# Patient Record
Sex: FEMALE | Race: WHITE | NOT HISPANIC OR LATINO | Employment: PART TIME | URBAN - METROPOLITAN AREA
[De-identification: names, ages, dates, MRNs, and addresses within clinical notes are randomized per-mention and may not be internally consistent; named-entity substitution may affect disease eponyms.]

---
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
## 2020-04-01 ENCOUNTER — HOSPITAL ENCOUNTER (EMERGENCY)
Facility: CLINIC | Age: 20
Discharge: HOME | End: 2020-04-01
Attending: EMERGENCY MEDICINE | Admitting: EMERGENCY MEDICINE

## 2020-04-01 VITALS
RESPIRATION RATE: 25 BRPM | SYSTOLIC BLOOD PRESSURE: 148 MMHG | DIASTOLIC BLOOD PRESSURE: 74 MMHG | HEART RATE: 110 BPM | OXYGEN SATURATION: 99 % | TEMPERATURE: 97.8 F

## 2020-04-01 DIAGNOSIS — F43.0 ACUTE REACTION TO STRESS: ICD-10-CM

## 2020-04-01 DIAGNOSIS — R06.02 SHORTNESS OF BREATH: ICD-10-CM

## 2020-04-01 PROCEDURE — 99283 EMERGENCY DEPT VISIT LOW MDM: CPT

## 2020-04-01 PROCEDURE — 25000132 ZZH RX MED GY IP 250 OP 250 PS 637: Performed by: EMERGENCY MEDICINE

## 2020-04-01 RX ORDER — LORAZEPAM 0.5 MG/1
0.5 TABLET ORAL EVERY 6 HOURS PRN
Qty: 15 TABLET | Refills: 0 | Status: SHIPPED | OUTPATIENT
Start: 2020-04-01

## 2020-04-01 RX ORDER — LORAZEPAM 1 MG/1
1 TABLET ORAL ONCE
Status: COMPLETED | OUTPATIENT
Start: 2020-04-01 | End: 2020-04-01

## 2020-04-01 RX ADMIN — LORAZEPAM 1 MG: 1 TABLET ORAL at 23:44

## 2020-04-01 NOTE — ED AVS SNAPSHOT
Emergency Department  [address] [facility] [address]  Phone:  [phone]  Fax:  [phone]                                    Alessia Stone   MRN: [id number]    Department:   Emergency Department   Date of Visit:  4/1/2020           After Visit Summary Signature Page    I have received my discharge instructions, and my questions have been answered. I have discussed any challenges I see with this plan with the nurse or doctor.    ..........................................................................................................................................  Patient/Patient Representative Signature      ..........................................................................................................................................  Patient Representative Print Name and Relationship to Patient    ..................................................               ................................................  Date                                   Time    ..........................................................................................................................................  Reviewed by Signature/Title    ...................................................              ..............................................  Date                                               Time          22EPIC Rev 08/18

## 2020-04-02 NOTE — ED PROVIDER NOTES
History     Chief Complaint:    Shortness of Breath and Cough    The history is provided by the patient.      Alessia Stone is a 19 year old female who presents with shortness of breath for the past 2 days.  While the triage note states that she has had a fever, she denies this, noting that she checks her temperature regularly and has not had an elevated temperature.  She notes that she has had a very mild cough that has been nonproductive in nature.  However, she has become so petrified that she is infected with the pandemic of coronavirus that she states that she has spells where she cannot catch her breath.  She feels as though her chest is getting tight and her heart is pounding.  She feels as though she cannot get enough air.  She finds that this comes and goes throughout the day without any clear exacerbating or alleviating cause.  She admits that she has a history of anxiety and this feels similar to prior panic attacks, but she is worried that it is due to an infectious cause.  She otherwise denies nasal congestion or sore throat.  She denies sick contacts or other high risk issues for coronavirus.  She denies other complaints at this juncture.      Allergies:  Amoxicillin     Medications:    Norco  Nifedipine  Zofran   Past Medical History:    ADHD    Past Surgical History:    The patient does not have any pertinent past surgical history.    Family History:    No past pertinent family history.    Social History:  Negative for tobacco use - former smoker.  Positive for alcohol use - occasionally.  Negative for drug use.  Marital Status:  Single [1]     Review of Systems  Pertinent positives and negatives as above.  A 14-point review of systems was performed with all other systems reviewed as negative.      Physical Exam     Patient Vitals for the past 24 hrs:   BP Temp Temp src Pulse Heart Rate Resp SpO2   04/01/20 2302 -- -- -- -- -- -- 99 %   04/01/20 2300 (!) 148/74 97.8  F (36.6  C) Oral 110 110 25  98 %     Physical Exam  General:  Markedly anxious young woman, almost unable to provide history due to her panic.     Eye:  Pupils are equal, round, and reactive.  Extraocular movements intact.    ENT:  Patient is masked during my assessment, though no audible signs of congestion or laryngitis.    Pulmonary: Patient is hyperventilating though oxygenating at 99 to 100% throughout her evaluation.  There is no audible wheezing or stridor noted.    Cardiac:  While not auscultated, heart rate appears top be regular on the monitor, ranging from 100-130 based on her level of anxiety.    Musculoskeletal:  Normal movement of all extremities without evidence for deficit.    Skin:  Warm and dry without rashes.    Neurologic:  Non-focal exam without asymmetric weakness or numbness.     Psychiatric:  Patient is tearful though able to calm herself. No suicidal ideation.    Emergency Department Course     Interventions:  2344: Ativan 1 mg PO    Emergency Department Course:  Past medical records, nursing notes, and vitals reviewed.    2316: I performed an exam of the patient as documented above.     I personally reviewed the results with the Patient and answered all related questions prior to discharge.     Findings and plan explained to the Patient. Patient discharged home with instructions regarding supportive care, medications, and reasons to return. The importance of close follow-up was reviewed.     Impression & Plan     Medical Decision Making:  This unfortunate young woman presents to us with a sensation of shortness of breath that seems to be coming and going.  She describes a very minimal cough 2 days ago, but no ongoing cough here.  She has no signs of congestion.  She has no fever.  I explained to the patient this seems highly unlikely to represent an infectious etiology and even less likely to represent the serious sequelae of the coronavirus.  The patient appears overcome with panic.  I spoke with her mother on the  phone as well who notes that her daughter has struggled with this for some time.  With her normal oxygenation and history and physical being more consistent with this being an acute reaction to stress, I do not feel that she would require an x-ray, labs, or further work-up.  I did consider pulmonary embolism considering her tachycardia but she otherwise is PERC negative outside of her tachycardia and I feel that the likelihood of this representing a serious pathology with waxing and waning symptoms without other risk factors in the setting of being overtly anxious that I do not feel a d-dimer is necessary at this time.  Furthermore, the likelihood of acute coronary syndrome or other significant tachydysrhythmia would be unlikely.  I reassured the patient.  She was given a dose of lorazepam here and will be discharged with a prescription for the same which she can use when she is feeling overwhelmed.  I advised her to focus on meditation, exercise, and to be reassured that she does not appear to have an impending respiratory illness.  However, she was invited back at any point for worsening of condition or other emergent concerns.        Diagnosis:    ICD-10-CM    1. Shortness of breath  R06.02    2. Acute reaction to stress  F43.0     PROBABLE     Disposition:  Discharged to home.    Discharge Medications:  New Prescriptions    LORAZEPAM (ATIVAN) 0.5 MG TABLET    Take 1 tablet (0.5 mg) by mouth every 6 hours as needed for anxiety     Scribe Disclosure:  I, Debbie Moss, am serving as a scribe at 11:07 PM on 4/1/2020 to document services personally performed by Trierweiler, Chad A, MD based on my observations and the provider's statements to me.     Debbie Moss  4/1/2020    EMERGENCY DEPARTMENT       Trierweiler, Chad A, MD  04/02/20 0113       Trierweiler, Chad A, MD  04/02/20 0146

## 2020-07-10 ENCOUNTER — VIRTUAL VISIT (OUTPATIENT)
Dept: INTERNAL MEDICINE | Facility: CLINIC | Age: 20
End: 2020-07-10
Payer: MEDICAID

## 2020-07-10 DIAGNOSIS — F41.1 GAD (GENERALIZED ANXIETY DISORDER): Primary | ICD-10-CM

## 2020-07-10 DIAGNOSIS — F41.0 PANIC ATTACK: ICD-10-CM

## 2020-07-10 PROCEDURE — 99203 OFFICE O/P NEW LOW 30 MIN: CPT | Mod: 95 | Performed by: INTERNAL MEDICINE

## 2020-07-10 PROCEDURE — 96127 BRIEF EMOTIONAL/BEHAV ASSMT: CPT | Performed by: INTERNAL MEDICINE

## 2020-07-10 RX ORDER — HYDROXYZINE HYDROCHLORIDE 25 MG/1
25 TABLET, FILM COATED ORAL 3 TIMES DAILY PRN
Qty: 20 TABLET | Refills: 0 | Status: SHIPPED | OUTPATIENT
Start: 2020-07-10

## 2020-07-10 RX ORDER — CITALOPRAM HYDROBROMIDE 20 MG/1
20 TABLET ORAL DAILY
Qty: 30 TABLET | Refills: 0 | Status: SHIPPED | OUTPATIENT
Start: 2020-07-10

## 2020-07-10 ASSESSMENT — PATIENT HEALTH QUESTIONNAIRE - PHQ9: 5. POOR APPETITE OR OVEREATING: MORE THAN HALF THE DAYS

## 2020-07-10 ASSESSMENT — ANXIETY QUESTIONNAIRES
1. FEELING NERVOUS, ANXIOUS, OR ON EDGE: NEARLY EVERY DAY
IF YOU CHECKED OFF ANY PROBLEMS ON THIS QUESTIONNAIRE, HOW DIFFICULT HAVE THESE PROBLEMS MADE IT FOR YOU TO DO YOUR WORK, TAKE CARE OF THINGS AT HOME, OR GET ALONG WITH OTHER PEOPLE: VERY DIFFICULT
3. WORRYING TOO MUCH ABOUT DIFFERENT THINGS: NEARLY EVERY DAY
GAD7 TOTAL SCORE: 18
2. NOT BEING ABLE TO STOP OR CONTROL WORRYING: NEARLY EVERY DAY
5. BEING SO RESTLESS THAT IT IS HARD TO SIT STILL: NEARLY EVERY DAY
7. FEELING AFRAID AS IF SOMETHING AWFUL MIGHT HAPPEN: SEVERAL DAYS
6. BECOMING EASILY ANNOYED OR IRRITABLE: NEARLY EVERY DAY

## 2020-07-10 ASSESSMENT — ENCOUNTER SYMPTOMS
NERVOUS/ANXIOUS: 1
SLEEP DISTURBANCE: 1
HYPERACTIVE: 1

## 2020-07-10 NOTE — PROGRESS NOTES
".Alessia Stone is a 20 year old female who is being evaluated via a billable video visit.      The patient has been notified of following:     \"This video visit will be conducted via a call between you and your physician/provider. We have found that certain health care needs can be provided without the need for an in-person physical exam.  This service lets us provide the care you need with a video conversation.  If a prescription is necessary we can send it directly to your pharmacy.  If lab work is needed we can place an order for that and you can then stop by our lab to have the test done at a later time.    Video visits are billed at different rates depending on your insurance coverage.  Please reach out to your insurance provider with any questions.    If during the course of the call the physician/provider feels a video visit is not appropriate, you will not be charged for this service.\"    Patient has given verbal consent for Video visit? Yes  How would you like to obtain your AVS? Mail a copy  Patient would like the video invitation sent by: Text to cell phone: [phone]  Will anyone else be joining your video visit? No    Subjective     Alessia Stone is a 20 year old female who presents today via video visit for the following health issues:    [facility]    Video Start Time: 11:38 AM    Patient has anxiety symptoms for long time.  Anxiety is acting up. Super overwhelmed, hear racing, feels twitchy, worry about everything. Sleep is horrible, stay up till 4am, sleep 1-2 hrs/day. Denies suicidal ideation.  Live with mom, siblings, boy friend.    Try to cope by colouring, pressure rings.    Have been doing counseling for more than a year, tried lorazepam recently but didn't feel like it helped.    PREMA-7 SCORE 7/10/2020   Total Score 18       There is no problem list on file for this patient.    History reviewed. No pertinent surgical history.    Social History     Tobacco Use     Smoking status: Former Smoker " "    Smokeless tobacco: Never Used   Substance Use Topics     Alcohol use: Yes     Comment: occ     History reviewed. No pertinent family history.      Current Outpatient Medications   Medication Sig Dispense Refill     citalopram (CELEXA) 20 MG tablet Take 1 tablet (20 mg) by mouth daily 30 tablet 0     hydrOXYzine (ATARAX) 25 MG tablet Take 1 tablet (25 mg) by mouth 3 times daily as needed for anxiety 20 tablet 0     ibuprofen (ADVIL/MOTRIN) 200 MG tablet Take 3 tablets (600 mg) by mouth every 6 hours as needed for pain 20 tablet 0     LORazepam (ATIVAN) 0.5 MG tablet Take 1 tablet (0.5 mg) by mouth every 6 hours as needed for anxiety 15 tablet 0     Allergies   Allergen Reactions     Amoxicillin Nausea       Reviewed and updated as needed this visit by Provider    Review of Systems   Psychiatric/Behavioral: Positive for sleep disturbance. Negative for suicidal ideas. The patient is nervous/anxious and is hyperactive.           Objective         Physical Exam     \"GENERAL: Healthy, alert and no distress\",\"EYES: Eyes grossly normal to inspection.  No discharge or erythema, or obvious scleral/conjunctival abnormalities.\",\"RESP: No audible wheeze, cough, or visible cyanosis.  No visible retractions or increased work of breathing.  \",\"SKIN: Visible skin clear. No significant rash, abnormal pigmentation or lesions.\",\"NEURO: Cranial nerves grossly intact.  Mentation and speech appropriate for age.\",\"PSYCH: Mentation appears normal, affect normal/bright, judgement and insight intact, normal speech and appearance well-groomed.\"    Assessment & Plan     Alessia was seen today for anxiety.    Diagnoses and all orders for this visit:    PREMA (generalized anxiety disorder)  -     citalopram (CELEXA) 20 MG tablet; Take 1 tablet (20 mg) by mouth daily  -     EMOTIONAL / BEHAVIORAL ASSESSMENT    Panic attack  -     hydrOXYzine (ATARAX) 25 MG tablet; Take 1 tablet (25 mg) by mouth 3 times daily as needed for anxiety  -     " EMOTIONAL / BEHAVIORAL ASSESSMENT    Coping mechanisms discussed.  Not going to counseling nate to insurance reasons.  Trial of serotonin specific reuptake inhibitor.   Advised to call if symptoms are not improving or develops side effects.     Return in about 4 weeks (around 8/7/2020) for Video Visit.    Marybeth Hemphill MD  [facility]      Video-Visit Details    Type of service:  Video Visit    Video End Time:11:46 AM    Originating Location (pt. Location): Home    Distant Location (provider location):  HOME    Platform used for Video Visit: Doximity    Return in about 4 weeks (around 8/7/2020) for Video Visit.       Marybeth Hemphill MD

## 2020-07-10 NOTE — PATIENT INSTRUCTIONS
Patient Education     Understanding Anxiety Disorders  Almost everyone gets nervous now and then. It s normal to have knots in your stomach before a test, or for your heart to race on a first date. But an anxiety disorder is much more than a case of nerves. In fact, its symptoms may be overwhelming. But treatment can relieve many of these symptoms. Talking to your healthcare provider is the first step.    What are anxiety disorders?  An anxiety disorder causes intense feelings of panic and fear. These feelings may arise for no apparent reason. And they tend to recur again and again. They may prevent you from coping with life and cause you great distress. As a result, you may avoid anything that triggers your fear. In extreme cases, you may never leave the house. Anxiety disorders may cause other symptoms, such as:    Obsessive thoughts that are unwanted and you can t control    Constant nightmares or painful thoughts of the past    Nausea, sweating, and muscle tension    Trouble sleeping or concentrating  What causes anxiety disorders?  Anxiety disorders tend to run in families. For some people, childhood abuse or neglect may play a role. For others, stressful life events or trauma may trigger anxiety disorders. Anxiety can trigger low self-esteem and poor coping skills.    Panic disorder. This causes an intense fear of being in danger.    Phobias. These are extreme fears of certain objects, places, or events.    Obsessive-compulsive disorder. This causes you to have unwanted thoughts and urges. You also may perform certain actions over and over.    Posttraumatic stress disorder. This occurs in people who have survived a terrible ordeal. It can cause nightmares and flashbacks about the event.    Generalized anxiety disorder. This causes constant worry that can greatly disrupt your life.    Getting better  You may believe that nothing can help you. Or, you might fear what others may think. But most anxiety symptoms  can be eased. Having an anxiety disorder is nothing to be ashamed of. Most people do best with treatment that combines medicine and individual and group therapy. These aren t cures. But they can help you live a healthier life.  Date Last Reviewed: 2/1/2017 2000-2019 [employer]. [address] [place] [address], [place] [address]. All rights reserved. This information is not intended as a substitute for professional medical care. Always follow your healthcare professional's instructions.

## 2020-07-10 NOTE — NURSING NOTE
"Chief Complaint   Patient presents with     Anxiety     pt c/o anxiety sumptoms and pt has used Lorazepam.     initial LMP 06/05/2020 (Approximate)   Breastfeeding No  Estimated body mass index is 20.97 kg/m  as calculated from the following:    Height as of 9/23/16: 1.6 m (5' 3\").    Weight as of 4/25/18: 53.7 kg (118 lb 6.2 oz)..  bp completed using cuff size NA (Not Taken)  JUWAN CERDA LPN  "

## 2020-07-11 ASSESSMENT — ANXIETY QUESTIONNAIRES: GAD7 TOTAL SCORE: 18

## 2020-07-28 ENCOUNTER — APPOINTMENT (OUTPATIENT)
Dept: ULTRASOUND IMAGING | Facility: CLINIC | Age: 20
End: 2020-07-28
Attending: EMERGENCY MEDICINE
Payer: MEDICAID

## 2020-07-28 ENCOUNTER — HOSPITAL ENCOUNTER (EMERGENCY)
Facility: CLINIC | Age: 20
Discharge: HOME | End: 2020-07-28
Attending: EMERGENCY MEDICINE | Admitting: EMERGENCY MEDICINE
Payer: MEDICAID

## 2020-07-28 VITALS
HEART RATE: 92 BPM | TEMPERATURE: 98.2 F | DIASTOLIC BLOOD PRESSURE: 66 MMHG | OXYGEN SATURATION: 98 % | RESPIRATION RATE: 16 BRPM | SYSTOLIC BLOOD PRESSURE: 119 MMHG

## 2020-07-28 DIAGNOSIS — N83.201 CYST OF RIGHT OVARY: ICD-10-CM

## 2020-07-28 DIAGNOSIS — R10.2 PELVIC PAIN IN FEMALE: ICD-10-CM

## 2020-07-28 DIAGNOSIS — Z3A.01 LESS THAN 8 WEEKS GESTATION OF PREGNANCY: ICD-10-CM

## 2020-07-28 LAB
ABO + RH BLD: NORMAL
ABO + RH BLD: NORMAL
ALBUMIN UR-MCNC: 20 MG/DL
ANION GAP SERPL CALCULATED.3IONS-SCNC: 8 MMOL/L (ref 3–14)
APPEARANCE UR: CLEAR
B-HCG FREE SERPL-ACNC: >2000 IU/L
B-HCG SERPL-ACNC: ABNORMAL IU/L (ref 0–5)
BASOPHILS # BLD AUTO: 0 10E9/L (ref 0–0.2)
BASOPHILS NFR BLD AUTO: 0.3 %
BILIRUB UR QL STRIP: NEGATIVE
BLD GP AB SCN SERPL QL: NORMAL
BLOOD BANK CMNT PATIENT-IMP: NORMAL
BUN SERPL-MCNC: 11 MG/DL (ref 7–30)
CALCIUM SERPL-MCNC: 9.6 MG/DL (ref 8.5–10.1)
CHLORIDE SERPL-SCNC: 105 MMOL/L (ref 94–109)
CO2 SERPL-SCNC: 23 MMOL/L (ref 20–32)
COLOR UR AUTO: YELLOW
CREAT SERPL-MCNC: 0.43 MG/DL (ref 0.52–1.04)
DIFFERENTIAL METHOD BLD: ABNORMAL
EOSINOPHIL # BLD AUTO: 0.2 10E9/L (ref 0–0.7)
EOSINOPHIL NFR BLD AUTO: 2.7 %
ERYTHROCYTE [DISTWIDTH] IN BLOOD BY AUTOMATED COUNT: 12.4 % (ref 10–15)
GFR SERPL CREATININE-BSD FRML MDRD: >90 ML/MIN/{1.73_M2}
GLUCOSE SERPL-MCNC: 83 MG/DL (ref 70–99)
GLUCOSE UR STRIP-MCNC: NEGATIVE MG/DL
HCT VFR BLD AUTO: 40.8 % (ref 35–47)
HGB BLD-MCNC: 13.4 G/DL (ref 11.7–15.7)
HGB UR QL STRIP: ABNORMAL
IMM GRANULOCYTES # BLD: 0 10E9/L (ref 0–0.4)
IMM GRANULOCYTES NFR BLD: 0.3 %
KETONES UR STRIP-MCNC: NEGATIVE MG/DL
LEUKOCYTE ESTERASE UR QL STRIP: NEGATIVE
LYMPHOCYTES # BLD AUTO: 2.1 10E9/L (ref 0.8–5.3)
LYMPHOCYTES NFR BLD AUTO: 31.7 %
MCH RBC QN AUTO: 26.3 PG (ref 26.5–33)
MCHC RBC AUTO-ENTMCNC: 32.8 G/DL (ref 31.5–36.5)
MCV RBC AUTO: 80 FL (ref 78–100)
MONOCYTES # BLD AUTO: 0.8 10E9/L (ref 0–1.3)
MONOCYTES NFR BLD AUTO: 12 %
MUCOUS THREADS #/AREA URNS LPF: PRESENT /LPF
NEUTROPHILS # BLD AUTO: 3.6 10E9/L (ref 1.6–8.3)
NEUTROPHILS NFR BLD AUTO: 53 %
NITRATE UR QL: NEGATIVE
NRBC # BLD AUTO: 0 10*3/UL
NRBC BLD AUTO-RTO: 0 /100
PH UR STRIP: 6 PH (ref 5–7)
PLATELET # BLD AUTO: 315 10E9/L (ref 150–450)
POTASSIUM SERPL-SCNC: 3.8 MMOL/L (ref 3.4–5.3)
RBC # BLD AUTO: 5.09 10E12/L (ref 3.8–5.2)
RBC #/AREA URNS AUTO: 5 /HPF (ref 0–2)
SODIUM SERPL-SCNC: 136 MMOL/L (ref 133–144)
SOURCE: ABNORMAL
SP GR UR STRIP: 1.03 (ref 1–1.03)
SPECIMEN EXP DATE BLD: NORMAL
SQUAMOUS #/AREA URNS AUTO: 6 /HPF (ref 0–1)
UROBILINOGEN UR STRIP-MCNC: NORMAL MG/DL (ref 0–2)
WBC # BLD AUTO: 6.7 10E9/L (ref 4–11)
WBC #/AREA URNS AUTO: 2 /HPF (ref 0–5)

## 2020-07-28 PROCEDURE — 25000132 ZZH RX MED GY IP 250 OP 250 PS 637: Performed by: EMERGENCY MEDICINE

## 2020-07-28 PROCEDURE — 96374 THER/PROPH/DIAG INJ IV PUSH: CPT

## 2020-07-28 PROCEDURE — 25000128 H RX IP 250 OP 636: Performed by: EMERGENCY MEDICINE

## 2020-07-28 PROCEDURE — 86901 BLOOD TYPING SEROLOGIC RH(D): CPT | Performed by: EMERGENCY MEDICINE

## 2020-07-28 PROCEDURE — 99284 EMERGENCY DEPT VISIT MOD MDM: CPT | Mod: 25

## 2020-07-28 PROCEDURE — 85025 COMPLETE CBC W/AUTO DIFF WBC: CPT | Performed by: EMERGENCY MEDICINE

## 2020-07-28 PROCEDURE — 81001 URINALYSIS AUTO W/SCOPE: CPT | Performed by: EMERGENCY MEDICINE

## 2020-07-28 PROCEDURE — 86900 BLOOD TYPING SEROLOGIC ABO: CPT | Performed by: EMERGENCY MEDICINE

## 2020-07-28 PROCEDURE — 84702 CHORIONIC GONADOTROPIN TEST: CPT | Performed by: EMERGENCY MEDICINE

## 2020-07-28 PROCEDURE — 25800030 ZZH RX IP 258 OP 636: Performed by: EMERGENCY MEDICINE

## 2020-07-28 PROCEDURE — 86850 RBC ANTIBODY SCREEN: CPT | Performed by: EMERGENCY MEDICINE

## 2020-07-28 PROCEDURE — 96361 HYDRATE IV INFUSION ADD-ON: CPT

## 2020-07-28 PROCEDURE — 80048 BASIC METABOLIC PNL TOTAL CA: CPT | Performed by: EMERGENCY MEDICINE

## 2020-07-28 PROCEDURE — 76817 TRANSVAGINAL US OBSTETRIC: CPT

## 2020-07-28 PROCEDURE — 84702 CHORIONIC GONADOTROPIN TEST: CPT | Mod: 59

## 2020-07-28 RX ORDER — ONDANSETRON 2 MG/ML
4 INJECTION INTRAMUSCULAR; INTRAVENOUS ONCE
Status: COMPLETED | OUTPATIENT
Start: 2020-07-28 | End: 2020-07-28

## 2020-07-28 RX ORDER — ACETAMINOPHEN 500 MG
1000 TABLET ORAL ONCE
Status: COMPLETED | OUTPATIENT
Start: 2020-07-28 | End: 2020-07-28

## 2020-07-28 RX ORDER — ACETAMINOPHEN 500 MG
1000 TABLET ORAL EVERY 6 HOURS PRN
Qty: 30 TABLET | Refills: 0 | Status: SHIPPED | OUTPATIENT
Start: 2020-07-28 | End: 2020-08-04

## 2020-07-28 RX ADMIN — ACETAMINOPHEN 1000 MG: 500 TABLET, FILM COATED ORAL at 11:35

## 2020-07-28 RX ADMIN — SODIUM CHLORIDE 1000 ML: 9 INJECTION, SOLUTION INTRAVENOUS at 11:35

## 2020-07-28 RX ADMIN — ONDANSETRON 4 MG: 2 INJECTION INTRAMUSCULAR; INTRAVENOUS at 11:35

## 2020-07-28 ASSESSMENT — ENCOUNTER SYMPTOMS
CHILLS: 0
NAUSEA: 1
VOMITING: 0
ABDOMINAL PAIN: 1
FEVER: 0

## 2020-07-28 NOTE — ED PROVIDER NOTES
"  History     Chief Complaint:  Abdominal Pain       HPI   Alessia Stone is a 20 year old female who is 8 weeks pregnant  with a history of ovarian cysts presents with abdominal pain. The patient reports developing a sudden onset of \"left ovary\" pain this morning. She states that the pain does not radiate anywhere and rates the pain as a 7/10 pain. She has associated nausea but no vomiting. She has not taken anything for her pain. She denies any fevers, chills, or vaginal bleeding/discharge.  She has not yet established care with OBGYN but [facility]     Allergies:  Amoxicillin     Medications:    citalopram   Atarax   Ativan     Past Medical History:    ADHD (attention deficit hyperactivity disorder)   PREMA (generalized anxiety disorder)   Panic attack   Ovarian cysts    Past Surgical History:    Surgical history reviewed. No pertinent surgical history.    Family History:    Family history reviewed. No pertinent family history.      Social History:  Smoking Status: former  Smokeless Tobacco: Never Used  Alcohol Use: Yes  Drug Use: No  PCP: [facility], [place]     Review of Systems   Constitutional: Negative for chills and fever.   Gastrointestinal: Positive for abdominal pain (\"L. ovary\") and nausea. Negative for vomiting.   Genitourinary: Negative for vaginal bleeding.   All other systems reviewed and are negative.      Physical Exam     Patient Vitals for the past 24 hrs:   BP Temp Temp src Pulse Resp SpO2   20 1315 -- -- -- -- -- 98 %   20 1310 -- -- -- -- -- 98 %   20 1305 -- -- -- -- -- 99 %   20 1300 119/66 -- -- 92 16 97 %   20 1250 -- -- -- -- -- 98 %   20 1245 130/55 -- -- 106 -- 99 %   20 1240 136/67 -- -- -- -- --   20 1205 -- -- -- -- -- 99 %   20 1200 120/60 -- -- 101 16 99 %   20 1155 -- -- -- -- -- 99 %   20 1150 120/61 -- -- -- -- 100 %   20 1140 121/62 -- -- 106 -- -- "   07/28/20 [facility] 125/83 98.2  F (36.8  C) Oral 118 18 99 %        Physical Exam  Nursing note and vitals reviewed.  Constitutional: Well nourished.   Eyes: Conjunctiva normal.  Pupils are equal, round, and reactive to light.   ENT: Nose normal. Mucous membranes pink and moist.    Neck: Normal range of motion.  CVS: Sinus tachycardia.  Normal heart sounds.  No murmur.  Pulmonary: Lungs clear to auscultation bilaterally. No wheezes/rales/rhonchi.  GI: Abdomen soft. Mild L. Adnexal tenderness.  No rigidity or guarding.  No CVA tenderness  Pelvic: deferred by patient  MSK: No calf tenderness or swelling.  Neuro: Alert. Follows simple commands.  Skin: Skin is warm and dry. No rash noted.   Psychiatric: Normal affect.       Emergency Department Course     Imaging:  Radiology findings were communicated with the patient who voiced understanding of the findings.    US OB <14 Weeks W Transvaginal  Preliminary Result  IMPRESSION:   1. Single intrauterine gestation with cardiac activity at 6 weeks and  3 days.  2. 2.4 cm complex right ovarian cyst.  Reading per radiology     Laboratory:  Laboratory findings were communicated with the patient who voiced understanding of the findings.    UA with micro: Urine Blood trace (A) Protein Albumin Urine 20 (A) RBC/HPF 5 (H) Squamous epithelial/HPF urine 6 (H) Mucous urine present (A)  o/w wnl/negative       ABO/Rh type and screen: O positive     CBC:  WBC 6.7, HGB 13.4, , o/w WNL      BMP: AWNL (Creatinine 0.43 (L))     HCG Quantitative blood: 14,730 (H)     ISTAT HCG quantitative pregnancy POCT: >2,000 (H)     Interventions:  1135 Tylenol 1000 mg oral   1135 zofran 4 mg IV   1135 0.9% sodium chloride BOLUS 1000 mL IV     Emergency Department Course:  Past medical records, nursing notes, and vitals reviewed.    1107 I performed an exam of the patient as documented above.    IV was inserted and blood was drawn for laboratory testing, results above.     The patient provided a urine  sample here in the emergency department. This was sent for laboratory testing, findings above.     The patient was sent for imaging while in the emergency department, results above.       1305 Patient rechecked and updated.       Findings and plan explained to the Patient. Patient discharged home with instructions regarding supportive care, medications, and reasons to return. The importance of close follow-up was reviewed. The patient was prescribed Tylenol.       Impression & Plan     Medical Decision Making:  Alessia Stone is a 20 year old female who presents to the emergency department today with L. Pelvic pain.  She is nontoxic though mildly tachycardiac on arrival.  She is deferring pelvic exam though denies vaginal bleeding/discharge/concerns for STI.  Patient with confirmed live IUP today.  Incidental R. Ovarian cyst discussed with the patient.  No indication for RHOGAM.  UA without obvious infection; labs reassuring.  She reported symptom improvement after tylenol.  I doubt intraabdominal catastrophe including appendicitis, diverticulitis, pyelonephritis, SBO.  Her repeat abdominal exam is benign.  Monitor for fever, worsening pain, vaginal bleeding.  Recommended f/u OBGYN to establish care.  Counseled on importance of prenatals.  All questions addressed.      Discharge Diagnosis:    ICD-10-CM    1. Pelvic pain in female  R10.2    2. Less than 8 weeks gestation of pregnancy  Z3A.01    3. Cyst of right ovary  N83.201        Disposition:  Discharged to home.    Discharge Medications:  New Prescriptions    ACETAMINOPHEN (TYLENOL) 500 MG TABLET    Take 2 tablets (1,000 mg) by mouth every 6 hours as needed for mild pain       Scribe Disclosure:  Teresa MCCLELLANe Kimberly, am serving as a scribe at 11:07 AM on 7/28/2020 to document services personally performed by Lise Dalton DO based on my observations and the provider's statements to me.      7/28/2020   Lise Dalton DO McDonald, Lindsey E,  DO  07/28/20 132

## 2020-07-28 NOTE — ED TRIAGE NOTES
Patient presents with complaints of left lower quadrant pain which started this morning. Patient is currently 8 weeks pregnant. Denies any bleeding. ABC intact without need for intervention at this time.

## 2021-10-09 ENCOUNTER — NURSE TRIAGE (OUTPATIENT)
Dept: NURSING | Facility: CLINIC | Age: 21
End: 2021-10-09

## 2021-10-09 NOTE — TELEPHONE ENCOUNTER
Patient states she is 7 months postpartum and has thyroid issues. Today she noticed her thyroid gland is bulging from her neck. Her sister says the gland has gotten noticeably bigger since this morning. Patient reports difficulty breathing and her voice is hoarse. Reports she feels very sleepy. She is able to swallow, denies fever. Advised that she should be seen in the emergency room for an evaluation. Patient verbalized understanding and agreed to the disposition.    Martha Grayson RN/[facility]      COVID 19 Nurse Triage Plan/Patient Instructions    Please be aware that novel coronavirus (COVID-19) may be circulating in the community. If you develop symptoms such as fever, cough, or SOB or if you have concerns about the presence of another infection including coronavirus (COVID-19), please contact your health care provider or visit [email or web address].     Disposition/Instructions    ED Visit recommended. Follow protocol based instructions.     Bring Your Own Device:  Please also bring your smart device(s) (smart phones, tablets, laptops) and their charging cables for your personal use and to communicate with your care team during your visit.    Thank you for taking steps to prevent the spread of this virus.  o Limit your contact with others.  o Wear a simple mask to cover your cough.  o Wash your hands well and often.    Resources    [facility]: About COVID-19: [email or web address]    CDC: What to Do If You're Sick: www.cdc.gov/coronavirus/2019-ncov/about/steps-when-sick.html    CDC: Ending Home Isolation: www.cdc.gov/coronavirus/2019-ncov/hcp/disposition-in-home-patients.html     CDC: Caring for Someone: www.cdc.gov/coronavirus/2019-ncov/if-you-are-sick/care-for-someone.html     [facility]: Interim Guidance for Hospital Discharge to Home: www.health.[facility].[place].us/diseases/coronavirus/hcp/hospdischarge.pdf    [facility] clinical trials (COVID-19 research  studies): clinicalaffairs.[facility]-clinical-trials     Below are the COVID-19 hotlines at the [place] Department of [facility] ([facility]). Interpreters are available.   o For health questions: Call 026-143-5410 or 1-518.392.4262 (7 a.m. to 7 p.m.)  o For questions about schools and childcare: Call 695-911-9946 or 1-454.183.4615 (7 a.m. to 7 p.m.)     Reason for Disposition    [1] Node is in the neck AND [2] causes difficulty breathing    Additional Information    Negative: Severe difficulty breathing (e.g., struggling for each breath, speaks in single words)    Protocols used: LYMPH NODES - [id number]

## 2021-10-11 ENCOUNTER — NURSE TRIAGE (OUTPATIENT)
Dept: NURSING | Facility: CLINIC | Age: 21
End: 2021-10-11

## 2021-10-11 NOTE — TELEPHONE ENCOUNTER
Patient clling and stating her thyroids were swollen, and she was seen at [facility] on Saturday.    She had tests done there, and she is calling for the results.    She was advised to call [facility]. [place] for results.    Ruthann Arnold, RN RN  [facility] Triage/refill nurse    Reason for Disposition    [1] Caller requests to speak ONLY to PCP AND [2] urgent question    Protocols used: PCP CALL - NO TRIAGE-A-

## 2021-10-23 ENCOUNTER — APPOINTMENT (OUTPATIENT)
Dept: ULTRASOUND IMAGING | Facility: CLINIC | Age: 21
End: 2021-10-23
Attending: EMERGENCY MEDICINE
Payer: COMMERCIAL

## 2021-10-23 ENCOUNTER — HOSPITAL ENCOUNTER (EMERGENCY)
Facility: CLINIC | Age: 21
Discharge: HOME | End: 2021-10-24
Attending: EMERGENCY MEDICINE | Admitting: EMERGENCY MEDICINE
Payer: COMMERCIAL

## 2021-10-23 VITALS
HEART RATE: 87 BPM | OXYGEN SATURATION: 100 % | RESPIRATION RATE: 20 BRPM | WEIGHT: 121 LBS | BODY MASS INDEX: 21.44 KG/M2 | HEIGHT: 63 IN | SYSTOLIC BLOOD PRESSURE: 135 MMHG | DIASTOLIC BLOOD PRESSURE: 76 MMHG | TEMPERATURE: 97.7 F

## 2021-10-23 DIAGNOSIS — E05.90 HYPERTHYROIDISM: ICD-10-CM

## 2021-10-23 DIAGNOSIS — R55 NEAR SYNCOPE: ICD-10-CM

## 2021-10-23 DIAGNOSIS — R10.9 FLANK PAIN: ICD-10-CM

## 2021-10-23 LAB
ALBUMIN SERPL-MCNC: 3.3 G/DL (ref 3.4–5)
ALBUMIN UR-MCNC: NEGATIVE MG/DL
ALP SERPL-CCNC: 211 U/L (ref 40–150)
ALT SERPL W P-5'-P-CCNC: 34 U/L (ref 0–50)
ANION GAP SERPL CALCULATED.3IONS-SCNC: 6 MMOL/L (ref 3–14)
APPEARANCE UR: CLEAR
AST SERPL W P-5'-P-CCNC: 21 U/L (ref 0–45)
B-HCG FREE SERPL-ACNC: <5 IU/L (ref 0–5)
BASOPHILS # BLD AUTO: 0 10E3/UL (ref 0–0.2)
BASOPHILS NFR BLD AUTO: 0 %
BILIRUB SERPL-MCNC: 1.2 MG/DL (ref 0.2–1.3)
BILIRUB UR QL STRIP: NEGATIVE
BUN SERPL-MCNC: 9 MG/DL (ref 7–30)
CALCIUM SERPL-MCNC: 8.5 MG/DL (ref 8.5–10.1)
CHLORIDE BLD-SCNC: 106 MMOL/L (ref 94–109)
CO2 SERPL-SCNC: 26 MMOL/L (ref 20–32)
COLOR UR AUTO: NORMAL
CREAT SERPL-MCNC: 0.71 MG/DL (ref 0.52–1.04)
EOSINOPHIL # BLD AUTO: 0.1 10E3/UL (ref 0–0.7)
EOSINOPHIL NFR BLD AUTO: 1 %
ERYTHROCYTE [DISTWIDTH] IN BLOOD BY AUTOMATED COUNT: 13 % (ref 10–15)
GFR SERPL CREATININE-BSD FRML MDRD: >90 ML/MIN/1.73M2
GLUCOSE BLD-MCNC: 90 MG/DL (ref 70–99)
GLUCOSE UR STRIP-MCNC: NEGATIVE MG/DL
HCT VFR BLD AUTO: 38.1 % (ref 35–47)
HGB BLD-MCNC: 12 G/DL (ref 11.7–15.7)
HGB UR QL STRIP: NEGATIVE
HOLD SPECIMEN: NORMAL
HOLD SPECIMEN: NORMAL
IMM GRANULOCYTES # BLD: 0 10E3/UL
IMM GRANULOCYTES NFR BLD: 0 %
KETONES UR STRIP-MCNC: NEGATIVE MG/DL
LEUKOCYTE ESTERASE UR QL STRIP: NEGATIVE
LIPASE SERPL-CCNC: 66 U/L (ref 73–393)
LYMPHOCYTES # BLD AUTO: 3.2 10E3/UL (ref 0.8–5.3)
LYMPHOCYTES NFR BLD AUTO: 37 %
MAGNESIUM SERPL-MCNC: 2 MG/DL (ref 1.6–2.3)
MCH RBC QN AUTO: 25 PG (ref 26.5–33)
MCHC RBC AUTO-ENTMCNC: 31.5 G/DL (ref 31.5–36.5)
MCV RBC AUTO: 79 FL (ref 78–100)
MONOCYTES # BLD AUTO: 0.7 10E3/UL (ref 0–1.3)
MONOCYTES NFR BLD AUTO: 8 %
NEUTROPHILS # BLD AUTO: 4.6 10E3/UL (ref 1.6–8.3)
NEUTROPHILS NFR BLD AUTO: 54 %
NITRATE UR QL: NEGATIVE
NRBC # BLD AUTO: 0 10E3/UL
NRBC BLD AUTO-RTO: 0 /100
PH UR STRIP: 7 [PH] (ref 5–7)
PLATELET # BLD AUTO: 272 10E3/UL (ref 150–450)
POTASSIUM BLD-SCNC: 3.9 MMOL/L (ref 3.4–5.3)
PROT SERPL-MCNC: 6.7 G/DL (ref 6.8–8.8)
RBC # BLD AUTO: 4.8 10E6/UL (ref 3.8–5.2)
RBC URINE: <1 /HPF
SODIUM SERPL-SCNC: 138 MMOL/L (ref 133–144)
SP GR UR STRIP: 1.01 (ref 1–1.03)
SQUAMOUS EPITHELIAL: 1 /HPF
TROPONIN I SERPL-MCNC: <0.015 UG/L (ref 0–0.04)
UROBILINOGEN UR STRIP-MCNC: NORMAL MG/DL
WBC # BLD AUTO: 8.6 10E3/UL (ref 4–11)
WBC URINE: <1 /HPF

## 2021-10-23 PROCEDURE — 83735 ASSAY OF MAGNESIUM: CPT | Performed by: EMERGENCY MEDICINE

## 2021-10-23 PROCEDURE — 250N000011 HC RX IP 250 OP 636: Performed by: EMERGENCY MEDICINE

## 2021-10-23 PROCEDURE — 96361 HYDRATE IV INFUSION ADD-ON: CPT

## 2021-10-23 PROCEDURE — 84484 ASSAY OF TROPONIN QUANT: CPT | Performed by: EMERGENCY MEDICINE

## 2021-10-23 PROCEDURE — 93005 ELECTROCARDIOGRAM TRACING: CPT

## 2021-10-23 PROCEDURE — 258N000003 HC RX IP 258 OP 636: Performed by: EMERGENCY MEDICINE

## 2021-10-23 PROCEDURE — 83690 ASSAY OF LIPASE: CPT | Performed by: EMERGENCY MEDICINE

## 2021-10-23 PROCEDURE — 36415 COLL VENOUS BLD VENIPUNCTURE: CPT | Performed by: EMERGENCY MEDICINE

## 2021-10-23 PROCEDURE — 80053 COMPREHEN METABOLIC PANEL: CPT | Performed by: EMERGENCY MEDICINE

## 2021-10-23 PROCEDURE — 85025 COMPLETE CBC W/AUTO DIFF WBC: CPT | Performed by: EMERGENCY MEDICINE

## 2021-10-23 PROCEDURE — 76705 ECHO EXAM OF ABDOMEN: CPT

## 2021-10-23 PROCEDURE — 99285 EMERGENCY DEPT VISIT HI MDM: CPT | Mod: 25

## 2021-10-23 PROCEDURE — 84702 CHORIONIC GONADOTROPIN TEST: CPT

## 2021-10-23 PROCEDURE — 96374 THER/PROPH/DIAG INJ IV PUSH: CPT

## 2021-10-23 PROCEDURE — 81001 URINALYSIS AUTO W/SCOPE: CPT | Performed by: EMERGENCY MEDICINE

## 2021-10-23 RX ORDER — KETOROLAC TROMETHAMINE 15 MG/ML
15 INJECTION, SOLUTION INTRAMUSCULAR; INTRAVENOUS ONCE
Status: COMPLETED | OUTPATIENT
Start: 2021-10-23 | End: 2021-10-23

## 2021-10-23 RX ADMIN — KETOROLAC TROMETHAMINE 15 MG: 15 INJECTION, SOLUTION INTRAMUSCULAR; INTRAVENOUS at 23:47

## 2021-10-23 RX ADMIN — SODIUM CHLORIDE 1000 ML: 9 INJECTION, SOLUTION INTRAVENOUS at 21:59

## 2021-10-23 ASSESSMENT — ENCOUNTER SYMPTOMS
FLANK PAIN: 1
VOMITING: 1
PALPITATIONS: 1
SHORTNESS OF BREATH: 0
LIGHT-HEADEDNESS: 1
NUMBNESS: 1
NAUSEA: 1
FEVER: 0
FREQUENCY: 1

## 2021-10-23 ASSESSMENT — MIFFLIN-ST. JEOR: SCORE: 1282.98

## 2021-10-24 NOTE — ED TRIAGE NOTES
"Pt was recently dx with graves disease, started on new meds. Yesterday was 3rd day taking meds and pt noted feeling dizzy, like she was going to pass out. Also c/o hands and feet going numb, and an emesis. Pt also reports 3-4 days of right flank pain and urinary frequency. And c/o a \"pulsing pain\" from just below breasts to hips.   "

## 2021-10-24 NOTE — DISCHARGE INSTRUCTIONS
Please continue medications as previously prescribed.  Should you have any further concerns, please contact endocrine clinic on Monday.    Please return to the emergency department as needed for new or worsening symptoms including fainting, worsening flank pain, chest pain, shortness of breath, vomiting and unable to keep eating down, any other concerning symptoms.

## 2021-10-24 NOTE — ED PROVIDER NOTES
History   Chief Complaint:  Allergic Reaction       HPI   Alessia Stone is a 21 year old female with history of Graves disease, frequent kidney infections, BS, anxiety, and asthma who presents with lightheadedness, nausea, vomiting, hand and feet numbness, and visual disturbances since yesterday. She had a near syncopal event but did not lose consciousness. She was recently diagnosed with Graves disease and started on Tapazole and propranolol three days ago. Today she felt palpitations which she believes is due to her missing her dose of propranolol today. She also complains of right flank pain and urinary frequency, which she believes is similar to when she has had a kidney infection in the past. She denies any fever, current rash, new shortness of breath, or leg edema. Her LMP was two months ago.    Review of Systems   Constitutional: Negative for fever.   Eyes: Positive for visual disturbance.   Respiratory: Negative for shortness of breath.    Cardiovascular: Positive for palpitations. Negative for leg swelling.   Gastrointestinal: Positive for nausea and vomiting.   Genitourinary: Positive for flank pain (right) and frequency.   Skin: Negative for rash.   Neurological: Positive for light-headedness and numbness. Negative for syncope.   All other systems reviewed and are negative.        Allergies:  Adhesive Tape  Amoxicillin    Medications:  Albuterol  Colace  Fluconazole   Keppra  Depo-Provera   Tapazole  Celexa  Atarax  Ativan  Reglan  Cytotec   Zofran  Propranolol     Past Medical History:     ADHD  Anxiety with panic attacks  Depression   Marijuana abuse   Asthma   Graves disease   Gestational hypertension   Seizures   IBS  migraines    Family History:    Father - ADHD, arthritis  Sister - asthma    Social History:  Patient presents alone.    Physical Exam     Patient Vitals for the past 24 hrs:   BP Temp Temp src Pulse Resp SpO2 Height Weight   10/23/21 2001 135/76 97.7  F (36.5  C) Temporal 87 20 100  "% 1.6 m (5' 3\") 54.9 kg (121 lb)       Physical Exam  Constitutional: Well developed, nontox appearance  Head: Atraumatic.   Neck:  no stridor  Eyes: no scleral icterus  Cardiovascular: RRR, 2+ bilat radial pulses  Pulmonary/Chest: nml resp effort  Abdominal: ND, soft, NT, no rebound or guarding   : R CVA tenderness  Ext: Warm, well perfused, no edema  Neurological: A&O, symmetric facies, moves ext x4  Skin: Skin is warm and dry.   Psychiatric: Behavior is normal. Thought content normal.   Nursing note and vitals reviewed.    Emergency Department Course   ECG  ECG obtained at , ECG read at   Normal sinus rhythm. Normal ECG.   Rate 78 bpm. NE interval 192 ms. QRS duration 86 ms. QT/QTc 383/437 ms. P-R-T axes 44 83 44.     Imaging:  Abdomen US, Limited (RUQ)  IMPRESSION:  1.  Normal limited abdominal ultrasound.  Report per radiology    Laboratory:  CMP: alkaline phosphatase 211 (H), protein total 6.7 (L), albumin 3.3 (L), o/w WNL (Creatinine 0.71)     Magnesium: 2.0    CBC: WBC 8.6, HGB 12.0,      Lipase: 66 (L)    Troponin (Collected ): <0.015    UA with microscopic: WNL      HCG Quant: <5.0    Emergency Department Course:  Reviewed:  I reviewed nursing notes, vitals, past medical history and Care Everywhere    Assessments:   I obtained history and examined the patient as noted above.    I rechecked the patient and explained findings.     Interventions:   NS 1L IV Bolus    Disposition:  The patient was discharged to home.     Impression & Plan     CMS Diagnoses: None    Medical Decision Makin year old female presenting w/ right flank pain, transient lightheadedness and near syncope, intermittent paresthesias, palpitations     DDx includes hypothyroidism, electrolyte abnormality, orthostatic near syncope, vasovagal near syncope, UTI, pyelonephritis, medication adverse reaction.  EKG interpretation as noted above significant for NSR w/o acute ischemic findings.  Labs significant " for UA inconsistent with infection, mildly elevated alk phos, pregnancy test negative, WBC WNL.  Given elevated alk phos and right CVA/right upper quadrant tenderness, right upper quadrant ultrasound ordered for further evaluation and significant for no remarkable abnormality.   Alkaline phosphatase may also be elevated in setting of the patient's hyperthyroidism.  Given reassuring work-up, at this time I feel the pt is safe for discharge.  It is possible patient is somewhat symptomatic to her new beta-blocker, propranolol, but she was encouraged to continue use and that is not uncommon to have mild symptoms when for starting a beta-blocker or other antihypertensive or rate controlling medication.  The benefits of the medication also likely outweigh the risk.  Leg pain may be musculoskeletal in nature.  Doubt kidney stone given urinalysis without.  No hydronephrosis noted on right upper quadrant ultrasound.  Less likely PE or thoracic etiology given history and physical exam.  Recommendations given regarding follow up with PCP and return to the emergency department as needed for new or worsening symptoms.  Pt counseled on all results, disposition and diagnosis.  They are understanding and agreeable to plan. Patient discharged in stable condition.         Diagnosis:    ICD-10-CM    1. Flank pain  R10.9    2. Hyperthyroidism  E05.90    3. Near syncope  R55        Scribe Disclosure:  I, Danny Castañeda, am serving as a scribe at 8:32 PM on 10/23/2021 to document services personally performed by Luis Tam MD based on my observations and the provider's statements to me.              Luis Tam MD  10/24/21 0008

## 2021-10-25 LAB
ATRIAL RATE - MUSE: 78 BPM
DIASTOLIC BLOOD PRESSURE - MUSE: NORMAL MMHG
INTERPRETATION ECG - MUSE: NORMAL
P AXIS - MUSE: 44 DEGREES
PR INTERVAL - MUSE: 192 MS
QRS DURATION - MUSE: 86 MS
QT - MUSE: 384 MS
QTC - MUSE: 437 MS
R AXIS - MUSE: 83 DEGREES
SYSTOLIC BLOOD PRESSURE - MUSE: NORMAL MMHG
T AXIS - MUSE: 44 DEGREES
VENTRICULAR RATE- MUSE: 78 BPM

## 2021-11-29 ENCOUNTER — HOSPITAL ENCOUNTER (EMERGENCY)
Facility: CLINIC | Age: 21
Discharge: HOME | End: 2021-11-29
Attending: EMERGENCY MEDICINE | Admitting: EMERGENCY MEDICINE
Payer: COMMERCIAL

## 2021-11-29 VITALS
HEART RATE: 85 BPM | SYSTOLIC BLOOD PRESSURE: 130 MMHG | DIASTOLIC BLOOD PRESSURE: 81 MMHG | OXYGEN SATURATION: 100 % | TEMPERATURE: 98.8 F | RESPIRATION RATE: 24 BRPM

## 2021-11-29 DIAGNOSIS — J04.0 LARYNGITIS: ICD-10-CM

## 2021-11-29 DIAGNOSIS — J01.00 ACUTE NON-RECURRENT MAXILLARY SINUSITIS: ICD-10-CM

## 2021-11-29 PROCEDURE — 99282 EMERGENCY DEPT VISIT SF MDM: CPT

## 2021-11-29 RX ORDER — PREDNISONE 20 MG/1
TABLET ORAL
Qty: 10 TABLET | Refills: 0 | Status: SHIPPED | OUTPATIENT
Start: 2021-11-29

## 2021-11-29 RX ORDER — DOXYCYCLINE 100 MG/1
100 CAPSULE ORAL 2 TIMES DAILY
Qty: 14 CAPSULE | Refills: 0 | Status: SHIPPED | OUTPATIENT
Start: 2021-11-29 | End: 2021-12-06

## 2021-11-29 NOTE — ED TRIAGE NOTES
Pt aox4, ABCs intact. Pt c/o trouble breathing that started tonight. Pt also c/o headache, sore throat, cough, and sinus pressure. Pt states that she has swollen lymph nodes in her neck, around her breast, and in her groin. Pt has graves disease.

## 2021-11-29 NOTE — ED PROVIDER NOTES
Visit Date: 11/29/2021    CHIEF COMPLAINT:  Sinus infection, loss of voice.    HISTORY OF PRESENT ILLNESS:  This is a 21-year-old female who presents with now 7-8 days of sinus pressure, congestion with drainage, sore throat, and cough.  Over the last three days she has lost her voice.  She is vaccinated for COVID-19.  No objective fever.  No vomiting or diarrhea.  No rash.  No other complaints at this time.    PAST MEDICAL HISTORY:       1. Attention deficit hyperactivity disorder.     2. Anxiety.     3. Panic attacks.     4. Graves disease.    PAST SURGICAL HISTORY:  None.    MEDICATIONS:     1. Celexa.     2. Atarax.     3. Ativan.     4. Methimazole     ALLERGIES:       1. Adhesive tape.     2. Amoxicillin.    SOCIAL HISTORY:  The patient presents to the emergency department by herself.  She is a former smoker.    REVIEW OF SYSTEMS:  A pertinent 10-point review of systems is negative, except for that noted in the HPI.    PHYSICAL EXAMINATION:  GENERAL:  The patient is sitting up comfortably in bed.  Appropriate with interactions.  VITAL SIGNS:  Blood pressure 130/81.  Heart rate 85.  Respiratory rate 24.  Oxygen 100% on room air.  Temperature 98.8 degrees.  HEENT:  Mild erythema to the posterior oropharynx.  No abscess or exudate.  She is tolerating her secretions well.  Phonation is slightly hoarse.  NECK:  Full range of motion without rigidity.  LYMPHATICS:  Mild anterior chain cervical lymphadenopathy.  CARDIOVASCULAR:  Regular rhythm.  Normal rate.  No murmurs.  RESPIRATORY:  Clear to auscultation bilaterally without wheezes, rales, or rhonchi.  GASTROINTESTINAL:  Normal appearance.  SKIN:  No pertinent skin findings.  PSYCHIATRIC:  The patient has a normal mood and affect.  NEUROLOGIC:  The patient is alert and oriented x 4.  Normal strength.    LABORATORY DATA AND DIAGNOSTIC STUDIES:  None.    EMERGENCY DEPARTMENT COURSE AND MEDICAL DECISION MAKING:  This is a 21-year-old female with signs and symptoms  consistent with acute sinusitis, likely bacterial, given her symptoms.  She also has signs and symptoms consistent with laryngitis.  We will treat her with oral steroids, as well as antibiotic course.  No indication this represents a deep space tissue infection, such as epiglottitis, peritonsillar abscess, bacterial tracheitis, etc.  The patient is comfortable with this plan and will be discharged home.    DIAGNOSIS:     1. Acute bacterial maxillary sinusitis.     2. Laryngitis.    PLAN OF CARE:  As above.          Baldemar Barrientos MD        D: 2021   T: 2021   MT: [id number]    Name:     VICKIE CHAUDHARY  MRN:      [id number]        Account:    [id number]   :      2000           Visit Date: 2021     Document: [id number]

## 2022-06-25 ENCOUNTER — HOSPITAL ENCOUNTER (EMERGENCY)
Facility: CLINIC | Age: 22
Discharge: HOME | End: 2022-06-25
Attending: EMERGENCY MEDICINE | Admitting: EMERGENCY MEDICINE
Payer: COMMERCIAL

## 2022-06-25 ENCOUNTER — APPOINTMENT (OUTPATIENT)
Dept: ULTRASOUND IMAGING | Facility: CLINIC | Age: 22
End: 2022-06-25
Attending: EMERGENCY MEDICINE
Payer: COMMERCIAL

## 2022-06-25 VITALS
TEMPERATURE: 97.9 F | HEART RATE: 78 BPM | SYSTOLIC BLOOD PRESSURE: 110 MMHG | OXYGEN SATURATION: 100 % | DIASTOLIC BLOOD PRESSURE: 78 MMHG | RESPIRATION RATE: 16 BRPM

## 2022-06-25 DIAGNOSIS — N93.9 ABNORMAL UTERINE BLEEDING: ICD-10-CM

## 2022-06-25 DIAGNOSIS — N84.0 UTERINE POLYP: ICD-10-CM

## 2022-06-25 LAB
ANION GAP SERPL CALCULATED.3IONS-SCNC: 6 MMOL/L (ref 3–14)
BASOPHILS # BLD AUTO: 0 10E3/UL (ref 0–0.2)
BASOPHILS NFR BLD AUTO: 0 %
BUN SERPL-MCNC: 9 MG/DL (ref 7–30)
CALCIUM SERPL-MCNC: 9.1 MG/DL (ref 8.5–10.1)
CHLORIDE BLD-SCNC: 107 MMOL/L (ref 94–109)
CO2 SERPL-SCNC: 24 MMOL/L (ref 20–32)
CREAT SERPL-MCNC: 0.71 MG/DL (ref 0.52–1.04)
EOSINOPHIL # BLD AUTO: 0.1 10E3/UL (ref 0–0.7)
EOSINOPHIL NFR BLD AUTO: 1 %
ERYTHROCYTE [DISTWIDTH] IN BLOOD BY AUTOMATED COUNT: 12.4 % (ref 10–15)
GFR SERPL CREATININE-BSD FRML MDRD: >90 ML/MIN/1.73M2
GLUCOSE BLD-MCNC: 90 MG/DL (ref 70–99)
HCT VFR BLD AUTO: 40 % (ref 35–47)
HGB BLD-MCNC: 13.3 G/DL (ref 11.7–15.7)
IMM GRANULOCYTES # BLD: 0 10E3/UL
IMM GRANULOCYTES NFR BLD: 0 %
LYMPHOCYTES # BLD AUTO: 2.3 10E3/UL (ref 0.8–5.3)
LYMPHOCYTES NFR BLD AUTO: 30 %
MCH RBC QN AUTO: 28.9 PG (ref 26.5–33)
MCHC RBC AUTO-ENTMCNC: 33.3 G/DL (ref 31.5–36.5)
MCV RBC AUTO: 87 FL (ref 78–100)
MONOCYTES # BLD AUTO: 0.4 10E3/UL (ref 0–1.3)
MONOCYTES NFR BLD AUTO: 5 %
NEUTROPHILS # BLD AUTO: 4.7 10E3/UL (ref 1.6–8.3)
NEUTROPHILS NFR BLD AUTO: 64 %
NRBC # BLD AUTO: 0 10E3/UL
NRBC BLD AUTO-RTO: 0 /100
PLATELET # BLD AUTO: 287 10E3/UL (ref 150–450)
POTASSIUM BLD-SCNC: 3.6 MMOL/L (ref 3.4–5.3)
RBC # BLD AUTO: 4.61 10E6/UL (ref 3.8–5.2)
SODIUM SERPL-SCNC: 137 MMOL/L (ref 133–144)
WBC # BLD AUTO: 7.4 10E3/UL (ref 4–11)

## 2022-06-25 PROCEDURE — 36415 COLL VENOUS BLD VENIPUNCTURE: CPT | Performed by: EMERGENCY MEDICINE

## 2022-06-25 PROCEDURE — 99284 EMERGENCY DEPT VISIT MOD MDM: CPT | Mod: 25

## 2022-06-25 PROCEDURE — 82310 ASSAY OF CALCIUM: CPT | Performed by: EMERGENCY MEDICINE

## 2022-06-25 PROCEDURE — 85025 COMPLETE CBC W/AUTO DIFF WBC: CPT | Performed by: EMERGENCY MEDICINE

## 2022-06-25 PROCEDURE — 76856 US EXAM PELVIC COMPLETE: CPT

## 2022-06-25 PROCEDURE — 93971 EXTREMITY STUDY: CPT | Mod: RT

## 2022-06-25 ASSESSMENT — ENCOUNTER SYMPTOMS
ABDOMINAL PAIN: 1
HEMATURIA: 0
DIZZINESS: 1

## 2022-06-25 NOTE — DISCHARGE INSTRUCTIONS
What do you do next:   Continue your home medications unless we have specifically changed them  I would suggest that you take 600 mg of ibuprofen by mouth every 8 hours as needed for fever or pain.  Take this with food or milk to avoid stomach upset.  Follow up as indicated below    When do you return: If you have uncontrollable pain, severe lightheadedness or fainting, vaginal bleeding or you soak a pad edge to edge twice within an hour, or any other symptoms that concern you, please return to the ED for reevaluation.    Thank you for allowing us to care for you today.

## 2022-06-25 NOTE — ED PROVIDER NOTES
"  History     Chief Complaint:  Vaginal Bleeding      HPI   Alessia Stone is a 22 year old female who presents with vaginal bleeding. The patient has had vaginal blood clots since the end of April. She had a D&C for an \"unhealthy pregnancy\". She states that the blood clots is \"constant\" and can sometimes be the size of her palm. She was see in the UR. She has a transvaginal ultrasound later this week with her OB/GYN on Tuesday. She has intermittent dizziness that is worse with standing. She has pain with bowel movements. She was getting lightheaded back in May when she was in [place]. She also had leg bruising as well. She has leg pain but no swelling. She denies injury to the legs. She has had abdominal pain for the past couple weeks. She denies blood in urine but feels pressure when urinating since the last week of April.    Review of Systems   Gastrointestinal: Positive for abdominal pain.        Pain with BM   Genitourinary: Positive for vaginal bleeding. Negative for hematuria.        Pressure when urinating   Musculoskeletal:        Leg pain, no swelling   Neurological: Positive for dizziness.   All other systems reviewed and are negative.    Allergies:  Adhesive Tape  Amoxicillin    Medications:    citalopram  hydroxyzine   Lorazepam   methimazole  prednisone  Levothyroxine  Fexofenadine  Flonase    Past Medical History:    ADHD  PREMA  Graves disease  Panic attack  Vitamin D deficiency  Bipolar disorder  Alkaline phosphatase elevation  Anxiety  Depression  Seizures  Asthma    Social History:  Presents alone  [facility], [place]  Physical Exam     Patient Vitals for the past 24 hrs:   BP Temp Temp src Pulse Resp SpO2   06/25/22 1822 -- -- -- -- -- 100 %   06/25/22 1821 -- -- -- -- -- 99 %   06/25/22 1815 110/78 -- -- -- -- --   06/25/22 1601 115/80 97.9  F (36.6  C) Oral 78 16 98 %       Physical Exam  Constitutional: Vital signs reviewed as above.   HENT:    Head: No external signs of trauma " noted.   Eyes: Conjunctivae are normal. Pupils are equal, round, and reactive to light.   Cardiovascular:    Normal rate, regular rhythm and normal heart sounds.     Exam reveals no friction rub.     No murmur heard.  Pulmonary/Chest:    Effort normal and breath sounds normal.    No respiratory distress.    There are no wheezes.    There are no rales.   Gastrointestinal:    Soft.    Bowel sounds normal.    There is no distension.    There is mild infraumbilical/suprapubic tenderness.    There is no rebound or guarding.   Musculoskeletal:    Normal range of motion.    Normal Tone   No pitting edema noted in the LE   Mild discomfort in the distal medial right calf.  Neurological: Patient is alert and oriented to person, place, and time.   Skin: Skin is warm and dry. Patient is not diaphoretic  Psychiatric: The patient appears calm      Emergency Department Course     Imaging:  US Lower Extremity Venous Duplex Right   Final Result   IMPRESSION:   1.  No deep venous thrombosis in the right lower extremity.      US Pelvic Complete with Transvaginal   Final Result   IMPRESSION:   1.  Echogenic focus measuring 9 mm with internal vascularity in the mid endometrial canal possibly reflecting a polyp. OB/GYN consultation and direct visualization is recommended in light of abnormal uterine bleeding.                 Report per radiology    Laboratory:  Labs Ordered and Resulted from Time of ED Arrival to Time of ED Departure   BASIC METABOLIC PANEL - Normal       Result Value    Sodium 137      Potassium 3.6      Chloride 107      Carbon Dioxide (CO2) 24      Anion Gap 6      Urea Nitrogen 9      Creatinine 0.71      Calcium 9.1      Glucose 90      GFR Estimate >90     CBC WITH PLATELETS AND DIFFERENTIAL    WBC Count 7.4      RBC Count 4.61      Hemoglobin 13.3      Hematocrit 40.0      MCV 87      MCH 28.9      MCHC 33.3      RDW 12.4      Platelet Count 287      % Neutrophils 64      % Lymphocytes 30      % Monocytes 5      %  Eosinophils 1      % Basophils 0      % Immature Granulocytes 0      NRBCs per 100 WBC 0      Absolute Neutrophils 4.7      Absolute Lymphocytes 2.3      Absolute Monocytes 0.4      Absolute Eosinophils 0.1      Absolute Basophils 0.0      Absolute Immature Granulocytes 0.0      Absolute NRBCs 0.0       Emergency Department Course:    Reviewed:  I reviewed nursing notes, vitals, past medical history, Care Everywhere and [facility]    Assessments/Consults:   ED Course as of 06/25/22 1833   Sat Jun 25, 2022   1634 I obtained the patient's history and examined them.   1759 Rechecked and updated.     Disposition:  The patient was discharged to home.     Impression & Plan      Medical Decision Making:  This 22-year-old female patient presents to the ED due to vaginal bleeding.  Please see the HPI and exam for specifics.  The patient remained stable in the ED.  Laboratory studies were ordered with results as above.  The patient is not anemic and I did not feel that blood transfusion would be required in this situation.  Ultrasound does reveal a uterine polyp which certainly could be the source of the patient's bleeding, though this was not present on the prior ultrasounds seen in epic.    At this time, I think the patient can be discharged.  She will follow with her OB/GYN next week and may return with any new or worsening symptoms.  Anticipatory guidance given to patient and significant other prior to discharge.    Diagnosis:    ICD-10-CM    1. Abnormal uterine bleeding  N93.9    2. Uterine polyp  N84.0        Discharge Medications:  Discharge Medication List as of 6/25/2022  6:16 PM        Scribe Disclosure:  Keshia MCCLELLAN Hired, am serving as a scribe at 4:11 PM on 6/25/2022 to document services personally performed by Jose Martinez DO based on my observations and the provider's statements to me.      Jose Martinez DO  06/25/22 5017

## 2022-06-25 NOTE — ED TRIAGE NOTES
Patient presents with vaginal bleeding and passing large clots. Has had vaginal bleeding since end of April. Has been seen at other ED's without clear answers. Has an appointment with her OBGYN Tuesday for a transvaginal US, but was advised by nurse line and urgent care to come to ED. Endorsing intermittent dizziness.

## 2023-05-26 NOTE — ED NOTES
Bed: ED05  Expected date:   Expected time:   Means of arrival:   Comments:  triage   Albendazole Counseling:  I discussed with the patient the risks of albendazole including but not limited to cytopenia, kidney damage, nausea/vomiting and severe allergy.  The patient understands that this medication is being used in an off-label manner.